# Patient Record
Sex: FEMALE | URBAN - METROPOLITAN AREA
[De-identification: names, ages, dates, MRNs, and addresses within clinical notes are randomized per-mention and may not be internally consistent; named-entity substitution may affect disease eponyms.]

---

## 2022-12-08 ENCOUNTER — HOSPITAL ENCOUNTER (INPATIENT)
Age: 52
LOS: 3 days | Discharge: HOME | DRG: 460 | End: 2022-12-11
Attending: INTERNAL MEDICINE | Admitting: INTERNAL MEDICINE
Payer: COMMERCIAL

## 2022-12-08 PROBLEM — N17.9 AKI (ACUTE KIDNEY INJURY) (HCC): Status: ACTIVE | Noted: 2022-12-08

## 2022-12-08 LAB
ANION GAP SERPL CALCULATED.3IONS-SCNC: 14 MMOL/L (ref 3–16)
ATYPICAL LYMPHOCYTE RELATIVE PERCENT: 3 % (ref 0–6)
BACTERIA: ABNORMAL /HPF
BASOPHILS ABSOLUTE: 0 K/UL (ref 0–0.2)
BASOPHILS RELATIVE PERCENT: 1 %
BILIRUBIN URINE: NEGATIVE
BLOOD, URINE: ABNORMAL
BUN BLDV-MCNC: 80 MG/DL (ref 7–20)
CALCIUM SERPL-MCNC: 7.3 MG/DL (ref 8.3–10.6)
CHLORIDE BLD-SCNC: 104 MMOL/L (ref 99–110)
CLARITY: CLEAR
CO2: 21 MMOL/L (ref 21–32)
COLOR: YELLOW
CREAT SERPL-MCNC: 5.8 MG/DL (ref 0.6–1.1)
EOSINOPHILS ABSOLUTE: 0 K/UL (ref 0–0.6)
EOSINOPHILS RELATIVE PERCENT: 1 %
EPITHELIAL CELLS, UA: 2 /HPF (ref 0–5)
GFR SERPL CREATININE-BSD FRML MDRD: 8 ML/MIN/{1.73_M2}
GLUCOSE BLD-MCNC: 206 MG/DL (ref 70–99)
GLUCOSE URINE: NEGATIVE MG/DL
HCT VFR BLD CALC: 22.8 % (ref 36–48)
HEMOGLOBIN: 7.7 G/DL (ref 12–16)
HYALINE CASTS: 14 /LPF (ref 0–8)
KETONES, URINE: NEGATIVE MG/DL
LEUKOCYTE ESTERASE, URINE: ABNORMAL
LYMPHOCYTES ABSOLUTE: 2.6 K/UL (ref 1–5.1)
LYMPHOCYTES RELATIVE PERCENT: 50 %
MCH RBC QN AUTO: 31.4 PG (ref 26–34)
MCHC RBC AUTO-ENTMCNC: 33.7 G/DL (ref 31–36)
MCV RBC AUTO: 93.1 FL (ref 80–100)
MICROSCOPIC EXAMINATION: YES
MONOCYTES ABSOLUTE: 0.1 K/UL (ref 0–1.3)
MONOCYTES RELATIVE PERCENT: 2 %
NEUTROPHILS ABSOLUTE: 2.1 K/UL (ref 1.7–7.7)
NEUTROPHILS RELATIVE PERCENT: 43 %
NITRITE, URINE: NEGATIVE
PDW BLD-RTO: 13.3 % (ref 12.4–15.4)
PH UA: 5.5 (ref 5–8)
PLATELET # BLD: 204 K/UL (ref 135–450)
PLATELET SLIDE REVIEW: ADEQUATE
PMV BLD AUTO: 8.6 FL (ref 5–10.5)
POTASSIUM SERPL-SCNC: 2.8 MMOL/L (ref 3.5–5.1)
PROTEIN UA: 30 MG/DL
RBC # BLD: 2.45 M/UL (ref 4–5.2)
RBC # BLD: NORMAL 10*6/UL
RBC UA: 1 /HPF (ref 0–4)
SLIDE REVIEW: ABNORMAL
SODIUM BLD-SCNC: 139 MMOL/L (ref 136–145)
SPECIFIC GRAVITY UA: 1.01 (ref 1–1.03)
TROPONIN: 0.01 NG/ML
URINE TYPE: ABNORMAL
UROBILINOGEN, URINE: 0.2 E.U./DL
WBC # BLD: 4.9 K/UL (ref 4–11)
WBC UA: 5 /HPF (ref 0–5)

## 2022-12-08 PROCEDURE — 6370000000 HC RX 637 (ALT 250 FOR IP): Performed by: INTERNAL MEDICINE

## 2022-12-08 PROCEDURE — 87086 URINE CULTURE/COLONY COUNT: CPT

## 2022-12-08 PROCEDURE — 84300 ASSAY OF URINE SODIUM: CPT

## 2022-12-08 PROCEDURE — 85025 COMPLETE CBC W/AUTO DIFF WBC: CPT

## 2022-12-08 PROCEDURE — 82570 ASSAY OF URINE CREATININE: CPT

## 2022-12-08 PROCEDURE — C9113 INJ PANTOPRAZOLE SODIUM, VIA: HCPCS | Performed by: INTERNAL MEDICINE

## 2022-12-08 PROCEDURE — 2580000003 HC RX 258: Performed by: INTERNAL MEDICINE

## 2022-12-08 PROCEDURE — 84484 ASSAY OF TROPONIN QUANT: CPT

## 2022-12-08 PROCEDURE — 83935 ASSAY OF URINE OSMOLALITY: CPT

## 2022-12-08 PROCEDURE — 81001 URINALYSIS AUTO W/SCOPE: CPT

## 2022-12-08 PROCEDURE — 2000000000 HC ICU R&B

## 2022-12-08 PROCEDURE — 80048 BASIC METABOLIC PNL TOTAL CA: CPT

## 2022-12-08 PROCEDURE — 2500000003 HC RX 250 WO HCPCS: Performed by: INTERNAL MEDICINE

## 2022-12-08 PROCEDURE — 6360000002 HC RX W HCPCS: Performed by: INTERNAL MEDICINE

## 2022-12-08 RX ORDER — SODIUM CHLORIDE 0.9 % (FLUSH) 0.9 %
5-40 SYRINGE (ML) INJECTION EVERY 12 HOURS SCHEDULED
Status: DISCONTINUED | OUTPATIENT
Start: 2022-12-08 | End: 2022-12-11 | Stop reason: HOSPADM

## 2022-12-08 RX ORDER — LOSARTAN POTASSIUM 100 MG/1
100 TABLET ORAL DAILY
Status: ON HOLD | COMMUNITY
End: 2022-12-11 | Stop reason: HOSPADM

## 2022-12-08 RX ORDER — ACETAMINOPHEN 325 MG/1
650 TABLET ORAL EVERY 6 HOURS PRN
Status: DISCONTINUED | OUTPATIENT
Start: 2022-12-08 | End: 2022-12-11 | Stop reason: HOSPADM

## 2022-12-08 RX ORDER — LAMOTRIGINE 100 MG/1
100 TABLET ORAL DAILY
COMMUNITY

## 2022-12-08 RX ORDER — PANTOPRAZOLE SODIUM 40 MG/10ML
40 INJECTION, POWDER, LYOPHILIZED, FOR SOLUTION INTRAVENOUS 2 TIMES DAILY
Status: DISCONTINUED | OUTPATIENT
Start: 2022-12-08 | End: 2022-12-11 | Stop reason: HOSPADM

## 2022-12-08 RX ORDER — NOREPINEPHRINE BIT/0.9 % NACL 16MG/250ML
1-100 INFUSION BOTTLE (ML) INTRAVENOUS CONTINUOUS
Status: DISCONTINUED | OUTPATIENT
Start: 2022-12-08 | End: 2022-12-11 | Stop reason: HOSPADM

## 2022-12-08 RX ORDER — HEPARIN SODIUM 5000 [USP'U]/ML
5000 INJECTION, SOLUTION INTRAVENOUS; SUBCUTANEOUS EVERY 8 HOURS SCHEDULED
Status: DISCONTINUED | OUTPATIENT
Start: 2022-12-08 | End: 2022-12-11 | Stop reason: HOSPADM

## 2022-12-08 RX ORDER — MIRTAZAPINE 15 MG/1
15 TABLET, FILM COATED ORAL NIGHTLY
COMMUNITY

## 2022-12-08 RX ORDER — ONDANSETRON HYDROCHLORIDE 8 MG/1
8 TABLET, FILM COATED ORAL EVERY 8 HOURS PRN
COMMUNITY

## 2022-12-08 RX ORDER — LISINOPRIL AND HYDROCHLOROTHIAZIDE 20; 12.5 MG/1; MG/1
1 TABLET ORAL DAILY
Status: ON HOLD | COMMUNITY
End: 2022-12-11 | Stop reason: HOSPADM

## 2022-12-08 RX ORDER — MAGNESIUM SULFATE IN WATER 40 MG/ML
2000 INJECTION, SOLUTION INTRAVENOUS PRN
Status: DISCONTINUED | OUTPATIENT
Start: 2022-12-08 | End: 2022-12-11 | Stop reason: HOSPADM

## 2022-12-08 RX ORDER — QUETIAPINE FUMARATE 100 MG/1
100 TABLET, FILM COATED ORAL NIGHTLY
COMMUNITY

## 2022-12-08 RX ORDER — ONDANSETRON 2 MG/ML
4 INJECTION INTRAMUSCULAR; INTRAVENOUS EVERY 6 HOURS PRN
Status: DISCONTINUED | OUTPATIENT
Start: 2022-12-08 | End: 2022-12-11 | Stop reason: HOSPADM

## 2022-12-08 RX ORDER — POTASSIUM CHLORIDE 20 MEQ/1
40 TABLET, EXTENDED RELEASE ORAL PRN
Status: DISCONTINUED | OUTPATIENT
Start: 2022-12-08 | End: 2022-12-11 | Stop reason: HOSPADM

## 2022-12-08 RX ORDER — SODIUM CHLORIDE 0.9 % (FLUSH) 0.9 %
5-40 SYRINGE (ML) INJECTION PRN
Status: DISCONTINUED | OUTPATIENT
Start: 2022-12-08 | End: 2022-12-11 | Stop reason: HOSPADM

## 2022-12-08 RX ORDER — ONDANSETRON 4 MG/1
4 TABLET, ORALLY DISINTEGRATING ORAL EVERY 8 HOURS PRN
Status: DISCONTINUED | OUTPATIENT
Start: 2022-12-08 | End: 2022-12-11 | Stop reason: HOSPADM

## 2022-12-08 RX ORDER — HYDROXYZINE HYDROCHLORIDE 25 MG/1
25 TABLET, FILM COATED ORAL EVERY 8 HOURS PRN
COMMUNITY

## 2022-12-08 RX ORDER — ACETAMINOPHEN 650 MG/1
650 SUPPOSITORY RECTAL EVERY 6 HOURS PRN
Status: DISCONTINUED | OUTPATIENT
Start: 2022-12-08 | End: 2022-12-11 | Stop reason: HOSPADM

## 2022-12-08 RX ORDER — POTASSIUM CHLORIDE 29.8 MG/ML
20 INJECTION INTRAVENOUS
Status: COMPLETED | OUTPATIENT
Start: 2022-12-08 | End: 2022-12-09

## 2022-12-08 RX ORDER — MIDODRINE HYDROCHLORIDE 5 MG/1
5 TABLET ORAL
Status: DISCONTINUED | OUTPATIENT
Start: 2022-12-08 | End: 2022-12-09

## 2022-12-08 RX ORDER — POTASSIUM CHLORIDE 7.45 MG/ML
10 INJECTION INTRAVENOUS PRN
Status: DISCONTINUED | OUTPATIENT
Start: 2022-12-08 | End: 2022-12-11 | Stop reason: HOSPADM

## 2022-12-08 RX ORDER — BUPRENORPHINE HYDROCHLORIDE AND NALOXONE HYDROCHLORIDE DIHYDRATE 8; 2 MG/1; MG/1
1 TABLET SUBLINGUAL DAILY
COMMUNITY

## 2022-12-08 RX ORDER — SODIUM CHLORIDE, SODIUM LACTATE, POTASSIUM CHLORIDE, CALCIUM CHLORIDE 600; 310; 30; 20 MG/100ML; MG/100ML; MG/100ML; MG/100ML
INJECTION, SOLUTION INTRAVENOUS CONTINUOUS
Status: DISCONTINUED | OUTPATIENT
Start: 2022-12-08 | End: 2022-12-09

## 2022-12-08 RX ORDER — SODIUM CHLORIDE 9 MG/ML
INJECTION, SOLUTION INTRAVENOUS PRN
Status: DISCONTINUED | OUTPATIENT
Start: 2022-12-08 | End: 2022-12-11 | Stop reason: HOSPADM

## 2022-12-08 RX ORDER — PANTOPRAZOLE SODIUM 40 MG/1
40 TABLET, DELAYED RELEASE ORAL DAILY
COMMUNITY

## 2022-12-08 RX ADMIN — Medication 10 ML: at 20:18

## 2022-12-08 RX ADMIN — HEPARIN SODIUM 5000 UNITS: 5000 INJECTION INTRAVENOUS; SUBCUTANEOUS at 21:45

## 2022-12-08 RX ADMIN — POTASSIUM CHLORIDE 20 MEQ: 400 INJECTION, SOLUTION INTRAVENOUS at 21:46

## 2022-12-08 RX ADMIN — POTASSIUM CHLORIDE 20 MEQ: 400 INJECTION, SOLUTION INTRAVENOUS at 23:12

## 2022-12-08 RX ADMIN — PANTOPRAZOLE SODIUM 40 MG: 40 INJECTION, POWDER, FOR SOLUTION INTRAVENOUS at 20:15

## 2022-12-08 RX ADMIN — Medication 3 MCG/MIN: at 20:57

## 2022-12-08 RX ADMIN — MIDODRINE HYDROCHLORIDE 5 MG: 5 TABLET ORAL at 18:37

## 2022-12-08 RX ADMIN — POTASSIUM CHLORIDE 20 MEQ: 400 INJECTION, SOLUTION INTRAVENOUS at 20:17

## 2022-12-08 RX ADMIN — SODIUM CHLORIDE: 9 INJECTION, SOLUTION INTRAVENOUS at 18:29

## 2022-12-08 RX ADMIN — ACETAMINOPHEN 650 MG: 325 TABLET ORAL at 21:48

## 2022-12-08 RX ADMIN — SODIUM CHLORIDE, POTASSIUM CHLORIDE, SODIUM LACTATE AND CALCIUM CHLORIDE: 600; 310; 30; 20 INJECTION, SOLUTION INTRAVENOUS at 18:26

## 2022-12-08 ASSESSMENT — PAIN DESCRIPTION - ORIENTATION
ORIENTATION: RIGHT
ORIENTATION: RIGHT

## 2022-12-08 ASSESSMENT — PAIN DESCRIPTION - PAIN TYPE: TYPE: CHRONIC PAIN

## 2022-12-08 ASSESSMENT — PAIN DESCRIPTION - DESCRIPTORS
DESCRIPTORS: ACHING
DESCRIPTORS: ACHING

## 2022-12-08 ASSESSMENT — PAIN SCALES - GENERAL
PAINLEVEL_OUTOF10: 6
PAINLEVEL_OUTOF10: 6

## 2022-12-08 ASSESSMENT — PAIN - FUNCTIONAL ASSESSMENT: PAIN_FUNCTIONAL_ASSESSMENT: PREVENTS OR INTERFERES SOME ACTIVE ACTIVITIES AND ADLS

## 2022-12-08 ASSESSMENT — PAIN DESCRIPTION - LOCATION
LOCATION: BACK;NECK
LOCATION: NECK;BACK

## 2022-12-08 ASSESSMENT — PAIN DESCRIPTION - FREQUENCY: FREQUENCY: CONTINUOUS

## 2022-12-08 NOTE — PROGRESS NOTES
Pt admitted to ICU bed #16. Pt alert and oriented X4. Able to follow commands. Vital signs stable. Complains of pain on her right side of her body 6/10. Dr. Phillip cortez served about pt admission. Waiting for orders.

## 2022-12-08 NOTE — H&P
Hospital Medicine History & Physical      PCP: No primary care provider on file. Date of Admission: 12/8/2022    Date of Service: Pt seen/examined on 12/8/2022 and Admitted to Inpatient with expected LOS greater than two midnights due to medical therapy. Chief Complaint:  AYSHA      History Of Present Illness:     47 y.o. female with PMH of Ulcerative colitis s/p colectomy and ileostomy, GERD, htn who presents with dizziness and generalized weakness. Was transferred from outside ED near [place] where she was found to be hypotensive and in severe AYSHA with Cr of 10.7. Had similar episode in the past.  Did have some nausea which has since improved. Dizziness better. No significant change in output from ileostomy. Denies acute complaints at this time  Continues to be hypotensive in 38C systolic      Past Medical History:      No past medical history on file. Past Surgical History:      No past surgical history on file. Medications Prior to Admission:      Prior to Admission medications    Not on File       Allergies:  Patient has no allergy information on record. Social History:      TOBACCO:   has no history on file for tobacco use. ETOH:   has no history on file for alcohol use. Family History:      No family history on file. REVIEW OF SYSTEMS:   Pertinent positives as noted in the HPI. All other systems reviewed and negative. PHYSICAL EXAM:    There were no vitals taken for this visit. Gen/overall appearance: Not in acute distress. Alert. Head: Normocephalic, atraumatic  Eyes: EOMI, no scleral icterus  CVS: regular rate and rhythm, Normal S1S2  Pulm: Clear to auscultation bilaterally. No crackles/wheezes  Gastrointestinal: Soft, nontender, nondistended, no guarding or rebound, R ileostomy  Extremities:No erythema or warmth  Neuro: No gross focal deficits noted  Skin: Warm, dry    Labs:     No results for input(s): WBC, HGB, HCT, PLT in the last 72 hours.   No results for input(s): NA, K, CL, CO2, BUN, CREATININE, CALCIUM, PHOS in the last 72 hours. Invalid input(s): MAGNES  No results for input(s): AST, ALT, BILIDIR, BILITOT, ALKPHOS in the last 72 hours. No results for input(s): INR in the last 72 hours. No results for input(s): Sudha Ying in the last 72 hours. Urinalysis:    No results found for: NITRU, [address], BACTERIA, RBCUA, BLOODU, SPECGRAV, GLUCOSEU      ASSESSMENT:    Shock. Suspect hypovolemic. Systolic 69O in ED; improving at this time  - ICU monitoring  - IVF hydration  - trial of po midodrine  - pressor support if persistent    Severe AYSHA. Likely prerenal azotemia. - Cr 10.7 in ED  - IVF hydration  - trend Cr  - monitor and replace lytes  - ins/outs  - avoid nephrotoxins  - nephro consult    PMH of Ulcerative colitis s/p colectomy and ileostomy, GERD, htn    DVT Prophylaxis: heparin  Diet: No diet orders on file  Code Status: No Order       Linda Zamora MD    Thank you No primary care provider on file. for the opportunity to be involved in this patient's care.

## 2022-12-09 ENCOUNTER — APPOINTMENT (OUTPATIENT)
Dept: ULTRASOUND IMAGING | Age: 52
DRG: 460 | End: 2022-12-09
Attending: INTERNAL MEDICINE
Payer: COMMERCIAL

## 2022-12-09 LAB
ALBUMIN SERPL-MCNC: 3.4 G/DL (ref 3.4–5)
ANION GAP SERPL CALCULATED.3IONS-SCNC: 12 MMOL/L (ref 3–16)
BASOPHILS ABSOLUTE: 0.1 K/UL (ref 0–0.2)
BASOPHILS RELATIVE PERCENT: 0.9 %
BUN BLDV-MCNC: 65 MG/DL (ref 7–20)
CALCIUM SERPL-MCNC: 8.5 MG/DL (ref 8.3–10.6)
CHLORIDE BLD-SCNC: 111 MMOL/L (ref 99–110)
CO2: 23 MMOL/L (ref 21–32)
CREAT SERPL-MCNC: 3.1 MG/DL (ref 0.6–1.1)
CREATININE URINE: 83.3 MG/DL (ref 28–259)
EOSINOPHILS ABSOLUTE: 0.1 K/UL (ref 0–0.6)
EOSINOPHILS RELATIVE PERCENT: 1.8 %
GFR SERPL CREATININE-BSD FRML MDRD: 18 ML/MIN/{1.73_M2}
GLUCOSE BLD-MCNC: 122 MG/DL (ref 70–99)
HCT VFR BLD CALC: 25.6 % (ref 36–48)
HEMOGLOBIN: 8.9 G/DL (ref 12–16)
INR BLD: 1.09 (ref 0.87–1.14)
LYMPHOCYTES ABSOLUTE: 3.7 K/UL (ref 1–5.1)
LYMPHOCYTES RELATIVE PERCENT: 47.7 %
MCH RBC QN AUTO: 32.2 PG (ref 26–34)
MCHC RBC AUTO-ENTMCNC: 34.7 G/DL (ref 31–36)
MCV RBC AUTO: 92.8 FL (ref 80–100)
MONOCYTES ABSOLUTE: 0.5 K/UL (ref 0–1.3)
MONOCYTES RELATIVE PERCENT: 7 %
NEUTROPHILS ABSOLUTE: 3.3 K/UL (ref 1.7–7.7)
NEUTROPHILS RELATIVE PERCENT: 42.6 %
PDW BLD-RTO: 13.2 % (ref 12.4–15.4)
PHOSPHORUS: 4.4 MG/DL (ref 2.5–4.9)
PLATELET # BLD: 250 K/UL (ref 135–450)
PMV BLD AUTO: 8.8 FL (ref 5–10.5)
POTASSIUM SERPL-SCNC: 3.4 MMOL/L (ref 3.5–5.1)
PROTHROMBIN TIME: 14 SEC (ref 11.7–14.5)
RBC # BLD: 2.76 M/UL (ref 4–5.2)
SODIUM BLD-SCNC: 146 MMOL/L (ref 136–145)
SODIUM URINE: 52 MMOL/L
URINE CULTURE, ROUTINE: NORMAL
WBC # BLD: 7.7 K/UL (ref 4–11)

## 2022-12-09 PROCEDURE — 99222 1ST HOSP IP/OBS MODERATE 55: CPT | Performed by: HOSPITALIST

## 2022-12-09 PROCEDURE — 6360000002 HC RX W HCPCS: Performed by: INTERNAL MEDICINE

## 2022-12-09 PROCEDURE — 36592 COLLECT BLOOD FROM PICC: CPT

## 2022-12-09 PROCEDURE — 2580000003 HC RX 258: Performed by: HOSPITALIST

## 2022-12-09 PROCEDURE — 76770 US EXAM ABDO BACK WALL COMP: CPT

## 2022-12-09 PROCEDURE — 2000000000 HC ICU R&B

## 2022-12-09 PROCEDURE — C9113 INJ PANTOPRAZOLE SODIUM, VIA: HCPCS | Performed by: INTERNAL MEDICINE

## 2022-12-09 PROCEDURE — 6370000000 HC RX 637 (ALT 250 FOR IP): Performed by: INTERNAL MEDICINE

## 2022-12-09 PROCEDURE — 80069 RENAL FUNCTION PANEL: CPT

## 2022-12-09 PROCEDURE — 85610 PROTHROMBIN TIME: CPT

## 2022-12-09 PROCEDURE — 85025 COMPLETE CBC W/AUTO DIFF WBC: CPT

## 2022-12-09 PROCEDURE — 2580000003 HC RX 258: Performed by: INTERNAL MEDICINE

## 2022-12-09 RX ORDER — SODIUM CHLORIDE 450 MG/100ML
INJECTION, SOLUTION INTRAVENOUS CONTINUOUS
Status: DISCONTINUED | OUTPATIENT
Start: 2022-12-09 | End: 2022-12-11

## 2022-12-09 RX ORDER — LAMOTRIGINE 100 MG/1
100 TABLET ORAL DAILY
Status: DISCONTINUED | OUTPATIENT
Start: 2022-12-09 | End: 2022-12-11 | Stop reason: HOSPADM

## 2022-12-09 RX ORDER — MIRTAZAPINE 15 MG/1
15 TABLET, FILM COATED ORAL NIGHTLY
Status: DISCONTINUED | OUTPATIENT
Start: 2022-12-09 | End: 2022-12-11 | Stop reason: HOSPADM

## 2022-12-09 RX ORDER — QUETIAPINE FUMARATE 100 MG/1
100 TABLET, FILM COATED ORAL NIGHTLY
Status: DISCONTINUED | OUTPATIENT
Start: 2022-12-09 | End: 2022-12-11 | Stop reason: HOSPADM

## 2022-12-09 RX ORDER — HYDROXYZINE HYDROCHLORIDE 25 MG/1
25 TABLET, FILM COATED ORAL EVERY 8 HOURS PRN
Status: DISCONTINUED | OUTPATIENT
Start: 2022-12-09 | End: 2022-12-11 | Stop reason: HOSPADM

## 2022-12-09 RX ORDER — MIDODRINE HYDROCHLORIDE 5 MG/1
10 TABLET ORAL
Status: DISCONTINUED | OUTPATIENT
Start: 2022-12-09 | End: 2022-12-10

## 2022-12-09 RX ORDER — BUPRENORPHINE HYDROCHLORIDE AND NALOXONE HYDROCHLORIDE DIHYDRATE 8; 2 MG/1; MG/1
2 TABLET SUBLINGUAL DAILY
Status: DISCONTINUED | OUTPATIENT
Start: 2022-12-09 | End: 2022-12-11 | Stop reason: HOSPADM

## 2022-12-09 RX ADMIN — SODIUM CHLORIDE, POTASSIUM CHLORIDE, SODIUM LACTATE AND CALCIUM CHLORIDE: 600; 310; 30; 20 INJECTION, SOLUTION INTRAVENOUS at 03:46

## 2022-12-09 RX ADMIN — PANTOPRAZOLE SODIUM 40 MG: 40 INJECTION, POWDER, FOR SOLUTION INTRAVENOUS at 20:58

## 2022-12-09 RX ADMIN — MIDODRINE HYDROCHLORIDE 10 MG: 5 TABLET ORAL at 17:26

## 2022-12-09 RX ADMIN — HEPARIN SODIUM 5000 UNITS: 5000 INJECTION INTRAVENOUS; SUBCUTANEOUS at 05:34

## 2022-12-09 RX ADMIN — LAMOTRIGINE 100 MG: 100 TABLET ORAL at 13:14

## 2022-12-09 RX ADMIN — BUPRENORPHINE HYDROCHLORIDE AND NALOXONE HYDROCHLORIDE DIHYDRATE 2 TABLET: 8; 2 TABLET SUBLINGUAL at 13:14

## 2022-12-09 RX ADMIN — SERTRALINE 50 MG: 50 TABLET, FILM COATED ORAL at 13:14

## 2022-12-09 RX ADMIN — Medication 10 ML: at 20:59

## 2022-12-09 RX ADMIN — HEPARIN SODIUM 5000 UNITS: 5000 INJECTION INTRAVENOUS; SUBCUTANEOUS at 13:14

## 2022-12-09 RX ADMIN — ACETAMINOPHEN 650 MG: 325 TABLET ORAL at 05:34

## 2022-12-09 RX ADMIN — Medication 10 ML: at 08:09

## 2022-12-09 RX ADMIN — PANTOPRAZOLE SODIUM 40 MG: 40 INJECTION, POWDER, FOR SOLUTION INTRAVENOUS at 08:08

## 2022-12-09 RX ADMIN — POTASSIUM CHLORIDE 40 MEQ: 1500 TABLET, EXTENDED RELEASE ORAL at 05:34

## 2022-12-09 RX ADMIN — QUETIAPINE FUMARATE 100 MG: 100 TABLET ORAL at 20:58

## 2022-12-09 RX ADMIN — MIDODRINE HYDROCHLORIDE 5 MG: 5 TABLET ORAL at 11:29

## 2022-12-09 RX ADMIN — MIDODRINE HYDROCHLORIDE 5 MG: 5 TABLET ORAL at 08:08

## 2022-12-09 RX ADMIN — HEPARIN SODIUM 5000 UNITS: 5000 INJECTION INTRAVENOUS; SUBCUTANEOUS at 20:58

## 2022-12-09 RX ADMIN — SODIUM CHLORIDE: 4.5 INJECTION, SOLUTION INTRAVENOUS at 11:24

## 2022-12-09 RX ADMIN — MIRTAZAPINE 15 MG: 15 TABLET, FILM COATED ORAL at 20:58

## 2022-12-09 ASSESSMENT — PAIN DESCRIPTION - DESCRIPTORS: DESCRIPTORS: ACHING

## 2022-12-09 ASSESSMENT — PAIN SCALES - GENERAL
PAINLEVEL_OUTOF10: 0
PAINLEVEL_OUTOF10: 5
PAINLEVEL_OUTOF10: 0

## 2022-12-09 ASSESSMENT — PAIN DESCRIPTION - ORIENTATION: ORIENTATION: RIGHT

## 2022-12-09 ASSESSMENT — PAIN DESCRIPTION - LOCATION: LOCATION: NECK;BACK

## 2022-12-09 NOTE — PROGRESS NOTES
4 Eyes Skin Assessment     NAME:  Chiquita Barrera  YOB: 1970  MEDICAL RECORD NUMBER:  [id number]    The patient is being assessed for  Admission    I agree that One RN have performed a thorough Head to Toe Skin Assessment on the patient. ALL assessment sites listed below have been assessed. Areas assessed by both nurses:    Head, Face, Ears, Shoulders, Back, Chest, Arms, Elbows, Hands, Sacrum. Buttock, Coccyx, Ischium, and Legs. Feet and Heels        Does the Patient have a Wound?  No noted wound(s)       Luis Manuel Prevention initiated by RN: NA   Wound Care Orders initiated by RN: No    Pressure Injury (Stage 3,4, Unstageable, DTI, NWPT, and Complex wounds) if present place referral order by RN under : No    New and Established Ostomies, if present place, referral order under : Yes      Nurse 1 eSignature: Electronically signed by Gita Rothman RN on 12/9/22 at 2:00 PM EST    **SHARE this note so that the co-signing nurse is able to place an eSignature**    Nurse 2 eSignature: {Esignature:[id number]}

## 2022-12-09 NOTE — PROGRESS NOTES
Shift assessment completed ( see Flow sheets for details). Pt alert and oriented X4. Able to follow commands. Denies any pain and discomfort at the moment. Pt states she is feeling better. Stays on pressure support, levo 2 mcg/min. Will titrate down to off as patient tolerates. Stays on continuous IV fluids. All safety measures stay active. Will continue to monitor.

## 2022-12-09 NOTE — PLAN OF CARE
Problem: Discharge Planning  Goal: Discharge to home or other facility with appropriate resources  Outcome: Progressing  Flowsheets  Taken 12/8/2022 2220  Discharge to home or other facility with appropriate resources:   Identify barriers to discharge with patient and caregiver   Arrange for needed discharge resources and transportation as appropriate   Refer to discharge planning if patient needs post-hospital services based on physician order or complex needs related to functional status, cognitive ability or social support system  Taken 12/8/2022 2000  Discharge to home or other facility with appropriate resources:   Identify barriers to discharge with patient and caregiver   Arrange for needed discharge resources and transportation as appropriate   Refer to discharge planning if patient needs post-hospital services based on physician order or complex needs related to functional status, cognitive ability or social support system     Problem: Safety - Adult  Goal: Free from fall injury  Outcome: Progressing     Problem: Pain  Goal: Verbalizes/displays adequate comfort level or baseline comfort level  Outcome: Progressing

## 2022-12-09 NOTE — PROGRESS NOTES
Hospitalist Progress Note      PCP: No primary care provider on file. Date of Admission: 12/8/2022    Chief Complaint: AYSHA      Subjective:   Denies new acute complaints. BP improving. Cr trending down       Medications:  Reviewed    Infusion Medications    sodium chloride 125 mL/hr at 12/09/22 1124    sodium chloride 5 mL/hr at 12/09/22 0546    norepinephrine 3 mcg/min (12/09/22 0546)     Scheduled Medications    buprenorphine-naloxone  2 tablet SubLINGual Daily    lamoTRIgine  100 mg Oral Daily    mirtazapine  15 mg Oral Nightly    QUEtiapine  100 mg Oral Nightly    sertraline  50 mg Oral Daily    midodrine  10 mg Oral TID WC    sodium chloride flush  5-40 mL IntraVENous 2 times per day    pantoprazole  40 mg IntraVENous BID    heparin (porcine)  5,000 Units SubCUTAneous 3 times per day     PRN Meds: hydrOXYzine HCl, sodium chloride flush, sodium chloride, ondansetron **OR** ondansetron, acetaminophen **OR** acetaminophen, magnesium sulfate, potassium chloride **OR** potassium alternative oral replacement **OR** potassium chloride      Intake/Output Summary (Last 24 hours) at 12/9/2022 1405  Last data filed at 12/9/2022 1200  Gross per 24 hour   Intake 2193.38 ml   Output 4155 ml   Net -1961.62 ml       Exam:    BP (!) 103/54   Pulse 73   Temp 97.5 °F (36.4 °C) (Temporal)   Resp 14   Ht 5' 5\" (1.651 m)   Wt 184 lb 8 oz (83.7 kg)   SpO2 99%   BMI 30.70 kg/m²     Gen/overall appearance: Not in acute distress. Alert. Head: Normocephalic, atraumatic  Eyes: EOMI, no scleral icterus  CVS: regular rate and rhythm, Normal S1S2  Pulm: Clear to auscultation bilaterally.  No crackles/wheezes  Gastrointestinal: Soft, nontender, nondistended, no guarding or rebound, R ileostomy  Extremities:No erythema or warmth  Neuro: No gross focal deficits noted  Skin: Warm, dry    Labs:   Recent Labs     12/08/22  1740 12/09/22  0335   WBC 4.9 7.7   HGB 7.7* 8.9*   HCT 22.8* 25.6*    250     Recent Labs 12/08/22  1740 12/09/22  0335    146*   K 2.8* 3.4*    111*   CO2 21 23   BUN 80* 65*   CREATININE 5.8* 3.1*   CALCIUM 7.3* 8.5   PHOS  --  4.4     No results for input(s): AST, ALT, BILIDIR, BILITOT, ALKPHOS in the last 72 hours. Recent Labs     12/09/22  0335   INR 1.09     Recent Labs     12/08/22 1740   TROPONINI 0.01       Assessment/Plan:    Active Hospital Problems    Diagnosis Date Noted    AYSHA (acute kidney injury) ([facility] [address] [N17.9] 12/08/2022     Priority: Medium       Shock. Suspect hypovolemic. Systolic 16L in ED; improving at this time  - ICU monitoring  - IVF hydration  - po midodrine up to 10  - pressor support as needed     Severe AYSHA. Likely prerenal azotemia. Improving  - Cr 10.7 in ED  - IVF hydration  - trend Cr  - monitor and replace lytes  - ins/outs  - avoid nephrotoxins  - nephro following; appreciate recs     PMH of Ulcerative colitis s/p colectomy and ileostomy, GERD, htn, illicit drug use on suboxone     DVT Prophylaxis: heparin  Diet: ADULT DIET;  Regular; Low Fat/Low Chol/High Fiber/FELICITY; Low Fiber  Code Status: Full Code      Linda Zamora MD

## 2022-12-09 NOTE — PROGRESS NOTES
Nutrition Note    RECOMMENDATIONS  PO Diet: Added low-fiber modifier to current diet  ONS: Denied need for any ONS    NUTRITION ASSESSMENT   Pt triggered for positive nutrition screen. Hx of UC s/p colectomy with ileostomy. Currently on cardiac restricted diet with one documented intake of 51-75% this morning. Upon visiting, pt reported 20 to 25 lb unintentional wt loss since September, some of which is associated with decreased po intake d/t having teeth pulled recently. Also reported acid reflux and \"stomach issues\" affect appetite/po intake. Said her UBW is 155 lb but bed wt today of 184 lb; no prior wt hx in EMR to accurately assess for recent wt changes. Reported she has only tolerated saltines and toast during current admission. Denied any ONS as they cause high ostomy output for her and denied need for easy to chew modifier. RD will continue to monitor for adequate po intake. Nutrition Related Findings: LR at 100 mL/hr, norepinephrine at 3 mcg/min. -1.4L. Na 146, K+ 3.4 and replaced, Cl 111. Ileostomy, BS hyperactive, diarrhea noted. No edema noted. Wounds: None  Nutrition Education:  Education not indicated   Nutrition Goals: PO intake 75% or greater, by next RD assessment     MALNUTRITION ASSESSMENT   Chronic Illness  Malnutrition Status: Insufficient data    NUTRITION DIAGNOSIS   Inadequate protein-energy intake related to altered GI structure as evidenced by poor intake prior to admission, weight loss    CURRENT NUTRITION THERAPIES  ADULT DIET; Regular; Low Fat/Low Chol/High Fiber/FELICITY     PO Intake: 51-75%   PO Supplement Intake:None Ordered    ANTHROPOMETRICS  Current Height: 5' 5\" (165.1 cm)  Current Weight: 184 lb 8 oz (83.7 kg)    Ideal Body Weight (IBW): 125 lbs  (57 kg)        BMI: 30.6    COMPARATIVE STANDARDS  Energy (kcal):  0165-9578     Protein (g):         Fluid (mL/day):  7854-2893    The patient will be monitored per nutrition standards of care.  Consult dietitian if additional nutrition interventions are needed prior to RD reassessment.      Scott De La Fuente MS, RD, LD    Contact: [phone]

## 2022-12-09 NOTE — PROGRESS NOTES
No significant events to note overnight. Pt A&O x4. Afebrile, SR per monitor, Sao2 maintained on RA with clear lung sounds bilaterally. BP supported with low dose levophed- see MAR for gtt rates. High output clear yellow urine per thompson catheter. Ileostomy intact and draining liquid brown green stool. K+ replaced. PRN tylenol x2 for c/o R neck and back pain. POC discussed and all questions addressed.

## 2022-12-09 NOTE — CONSULTS
Nephrology Consult Note                                                                                                                                                                                                                                                                                                                                                               Office : [phone]     Fax :[phone]              Patient's Name: Kate Blankenship  10:59 AM  12/9/2022    Reason for Consult: AYSHA  Requesting Physician:  No primary care provider on file. Chief Complaint:  weakness, dizziness     History of Present Ilness:    Kate Blankenship is a 47 y.o. female with PMH of UC s/p colectomy and ileostomy , HTN presented to OSH with c/o    Dizziness and generalized weakness  Found to have serum Cr ~ 10 and hypotension   Transferred to [facility] for further management    Serum Cr trending down  Alert awake   BP soft   On low dose of Levophed  Making urine          Past Medical History:   Diagnosis Date    CKD (chronic kidney disease)     Hypertension     Smoker     Ulcerative colitis ([address]        Past Surgical History:   Procedure Laterality Date    COLECTOMY      ILEOSTOMY      SPLENECTOMY         History reviewed. No pertinent family history. reports that she has been smoking cigarettes. She has been smoking an average of .5 packs per day. She has never used smokeless tobacco. She reports that she does not currently use drugs. She reports that she does not drink alcohol. Allergies:  Patient has no known allergies.     Current Medications:    sodium chloride flush 0.9 % injection 5-40 mL, 2 times per day  sodium chloride flush 0.9 % injection 5-40 mL, PRN  0.9 % sodium chloride infusion, PRN  ondansetron (ZOFRAN-ODT) disintegrating tablet 4 mg, Q8H PRN   Or  ondansetron (ZOFRAN) injection 4 mg, Q6H PRN  acetaminophen (TYLENOL) tablet 650 mg, Q6H PRN   Or  acetaminophen (TYLENOL) suppository 650 mg, Q6H PRN  lactated ringers infusion, Continuous  midodrine (PROAMATINE) tablet 5 mg, TID WC  pantoprazole (PROTONIX) injection 40 mg, BID  heparin (porcine) injection 5,000 Units, 3 times per day  magnesium sulfate 2000 mg in 50 mL IVPB premix, PRN  potassium chloride (KLOR-CON M) extended release tablet 40 mEq, PRN   Or  potassium bicarb-citric acid (EFFER-K) effervescent tablet 40 mEq, PRN   Or  potassium chloride 10 mEq/100 mL IVPB (Peripheral Line), PRN  norepinephrine (LEVOPHED) 16 mg in sodium chloride 0.9 % 250 mL infusion, Continuous        Review of Systems:   14 point ROS obtained but were negative except mentioned in HPI      Physical exam:     Vitals:  BP 92/65   Pulse 78   Temp 97.5 °F (36.4 °C) (Temporal)   Resp 18   Ht 5' 5\" (1.651 m)   Wt 184 lb 8 oz (83.7 kg)   SpO2 97%   BMI 30.70 kg/m²   Constitutional:  OAA X3 NAD  Skin: no rash, turgor wnl  Heent:  eomi, mmm  Neck: no bruits or jvd noted  Cardiovascular:  S1, S2 without m/r/g  Respiratory: CTA B without w/r/r  Abdomen:  +bs, soft, nt, nd    Osteomy +    Ext:  no lower extremity edema  Psychiatric: mood and affect appropriate  Musculoskeletal:  Rom, muscular strength intact    Data:   Labs:  CBC:   Recent Labs     12/08/22 1740 12/09/22  0335   WBC 4.9 7.7   HGB 7.7* 8.9*    250     BMP:    Recent Labs     12/08/22 1740 12/09/22  0335    146*   K 2.8* 3.4*    111*   CO2 21 23   BUN 80* 65*   CREATININE 5.8* 3.1*   GLUCOSE 206* 122*     Ca/Mg/Phos:   Recent Labs     12/08/22 1740 12/09/22  0335   CALCIUM 7.3* 8.5   PHOS  --  4.4     Hepatic: No results for input(s): AST, ALT, ALB, BILITOT, ALKPHOS in the last 72 hours. Troponin:   Recent Labs     12/08/22 1740   TROPONINI 0.01     BNP: No results for input(s): BNP in the last 72 hours. Lipids: No results for input(s): CHOL, TRIG, HDL, LDLCALC, LABVLDL in the last 72 hours.   ABGs: No results for input(s): PHART, PO2ART, CDU7HRF in the last 72 hours.  INR:   Recent Labs     12/09/22  0335   INR 1.09     UA:  Recent Labs     12/08/22  1830   COLORU Yellow   CLARITYU Clear   GLUCOSEU Negative   BILIRUBINUR Negative   KETUA Negative   SPECGRAV 1.010   BLOODU SMALL*   PHUR 5.5   PROTEINU 30*   UROBILINOGEN 0.2   NITRU Negative   LEUKOCYTESUR TRACE*   LABMICR YES   URINETYPE NotGiven      Urine Microscopic:   Recent Labs     12/08/22  1830   BACTERIA None Seen   HYALCAST 14*   WBCUA 5   RBCUA 1   EPIU 2     Urine Culture: No results for input(s): LABURIN in the last 72 hours.   Urine Chemistry:   Recent Labs     12/08/22  1830   LABCREA 83.3   NAUR 52             IMAGING:  US RETROPERITONEAL COMPLETE    (Results Pending)       Assessment/Plan       Acute renal failure     Etiology seems combination of pre renal and ATN    Pre renal from intravascular volume depletion    ATN from hypotension      AYSHA improving with IVF  Continue IVF   Change IVF to 1/2 NS due to serum Na 146  Keep MAP > 65 mmhg  Pressor support to keep MAP > 65 mmhg  Avoid contrast  Avoid NSAIDs  Avoid ACEI or ARB  I and O      Hypokalemia    Replace K with KCL prn    Hypernatremia    Change IVF to 1/2 NS    Shock     Continue IVF       H/o UC   S/p colectomy       Thank you for allowing us to participate in care of Renzo Osei MD  Feel free to contact me   [facility] [address]  Office : [phone]  Fax :[phone]

## 2022-12-10 LAB
ALBUMIN SERPL-MCNC: 3.4 G/DL (ref 3.4–5)
ANION GAP SERPL CALCULATED.3IONS-SCNC: 7 MMOL/L (ref 3–16)
BASOPHILS ABSOLUTE: 0 K/UL (ref 0–0.2)
BASOPHILS RELATIVE PERCENT: 0.8 %
BUN BLDV-MCNC: 32 MG/DL (ref 7–20)
CALCIUM SERPL-MCNC: 8.5 MG/DL (ref 8.3–10.6)
CHLORIDE BLD-SCNC: 111 MMOL/L (ref 99–110)
CO2: 26 MMOL/L (ref 21–32)
CREAT SERPL-MCNC: 1 MG/DL (ref 0.6–1.1)
EKG ATRIAL RATE: 67 BPM
EKG DIAGNOSIS: NORMAL
EKG P AXIS: 37 DEGREES
EKG P-R INTERVAL: 170 MS
EKG Q-T INTERVAL: 394 MS
EKG QRS DURATION: 88 MS
EKG QTC CALCULATION (BAZETT): 416 MS
EKG R AXIS: 14 DEGREES
EKG T AXIS: 23 DEGREES
EKG VENTRICULAR RATE: 67 BPM
EOSINOPHILS ABSOLUTE: 0.1 K/UL (ref 0–0.6)
EOSINOPHILS RELATIVE PERCENT: 1.4 %
GFR SERPL CREATININE-BSD FRML MDRD: >60 ML/MIN/{1.73_M2}
GLUCOSE BLD-MCNC: 165 MG/DL (ref 70–99)
HCT VFR BLD CALC: 30.7 % (ref 36–48)
HEMOGLOBIN: 10.2 G/DL (ref 12–16)
LYMPHOCYTES ABSOLUTE: 3.4 K/UL (ref 1–5.1)
LYMPHOCYTES RELATIVE PERCENT: 58.6 %
MCH RBC QN AUTO: 31.4 PG (ref 26–34)
MCHC RBC AUTO-ENTMCNC: 33.1 G/DL (ref 31–36)
MCV RBC AUTO: 94.8 FL (ref 80–100)
MONOCYTES ABSOLUTE: 0.4 K/UL (ref 0–1.3)
MONOCYTES RELATIVE PERCENT: 6.5 %
NEUTROPHILS ABSOLUTE: 1.9 K/UL (ref 1.7–7.7)
NEUTROPHILS RELATIVE PERCENT: 32.7 %
PDW BLD-RTO: 13.5 % (ref 12.4–15.4)
PHOSPHORUS: 2.5 MG/DL (ref 2.5–4.9)
PLATELET # BLD: 197 K/UL (ref 135–450)
PMV BLD AUTO: 9.4 FL (ref 5–10.5)
POTASSIUM SERPL-SCNC: 4 MMOL/L (ref 3.5–5.1)
RBC # BLD: 3.24 M/UL (ref 4–5.2)
SODIUM BLD-SCNC: 144 MMOL/L (ref 136–145)
WBC # BLD: 5.8 K/UL (ref 4–11)

## 2022-12-10 PROCEDURE — 80069 RENAL FUNCTION PANEL: CPT

## 2022-12-10 PROCEDURE — 99232 SBSQ HOSP IP/OBS MODERATE 35: CPT | Performed by: HOSPITALIST

## 2022-12-10 PROCEDURE — 6370000000 HC RX 637 (ALT 250 FOR IP): Performed by: INTERNAL MEDICINE

## 2022-12-10 PROCEDURE — 93005 ELECTROCARDIOGRAM TRACING: CPT | Performed by: INTERNAL MEDICINE

## 2022-12-10 PROCEDURE — 85025 COMPLETE CBC W/AUTO DIFF WBC: CPT

## 2022-12-10 PROCEDURE — C9113 INJ PANTOPRAZOLE SODIUM, VIA: HCPCS | Performed by: INTERNAL MEDICINE

## 2022-12-10 PROCEDURE — 2580000003 HC RX 258: Performed by: HOSPITALIST

## 2022-12-10 PROCEDURE — 2580000003 HC RX 258: Performed by: INTERNAL MEDICINE

## 2022-12-10 PROCEDURE — 2000000000 HC ICU R&B

## 2022-12-10 PROCEDURE — 6360000002 HC RX W HCPCS: Performed by: INTERNAL MEDICINE

## 2022-12-10 RX ORDER — MIDODRINE HYDROCHLORIDE 5 MG/1
10 TABLET ORAL
Status: DISCONTINUED | OUTPATIENT
Start: 2022-12-10 | End: 2022-12-11 | Stop reason: HOSPADM

## 2022-12-10 RX ADMIN — HEPARIN SODIUM 5000 UNITS: 5000 INJECTION INTRAVENOUS; SUBCUTANEOUS at 14:37

## 2022-12-10 RX ADMIN — LAMOTRIGINE 100 MG: 100 TABLET ORAL at 08:15

## 2022-12-10 RX ADMIN — PANTOPRAZOLE SODIUM 40 MG: 40 INJECTION, POWDER, FOR SOLUTION INTRAVENOUS at 08:15

## 2022-12-10 RX ADMIN — Medication 10 ML: at 20:45

## 2022-12-10 RX ADMIN — MIRTAZAPINE 15 MG: 15 TABLET, FILM COATED ORAL at 20:44

## 2022-12-10 RX ADMIN — SERTRALINE 50 MG: 50 TABLET, FILM COATED ORAL at 08:15

## 2022-12-10 RX ADMIN — BUPRENORPHINE HYDROCHLORIDE AND NALOXONE HYDROCHLORIDE DIHYDRATE 2 TABLET: 8; 2 TABLET SUBLINGUAL at 09:58

## 2022-12-10 RX ADMIN — PANTOPRAZOLE SODIUM 40 MG: 40 INJECTION, POWDER, FOR SOLUTION INTRAVENOUS at 20:44

## 2022-12-10 RX ADMIN — SODIUM CHLORIDE: 4.5 INJECTION, SOLUTION INTRAVENOUS at 04:14

## 2022-12-10 RX ADMIN — Medication 10 ML: at 08:24

## 2022-12-10 RX ADMIN — HEPARIN SODIUM 5000 UNITS: 5000 INJECTION INTRAVENOUS; SUBCUTANEOUS at 20:44

## 2022-12-10 RX ADMIN — MIDODRINE HYDROCHLORIDE 10 MG: 5 TABLET ORAL at 17:14

## 2022-12-10 RX ADMIN — HEPARIN SODIUM 5000 UNITS: 5000 INJECTION INTRAVENOUS; SUBCUTANEOUS at 05:15

## 2022-12-10 RX ADMIN — QUETIAPINE FUMARATE 100 MG: 100 TABLET ORAL at 20:44

## 2022-12-10 RX ADMIN — MIDODRINE HYDROCHLORIDE 10 MG: 5 TABLET ORAL at 12:37

## 2022-12-10 ASSESSMENT — PAIN SCALES - GENERAL
PAINLEVEL_OUTOF10: 0

## 2022-12-10 NOTE — PROGRESS NOTES
Pt had short episode of bradycardia HR 28 and dizziness, pt was sitting up in bed nurse was drawing labs, pt recovered quickly, EKG completed, pt resting in bed A&Ox4, no further complaints of dizziness vital signs currently stable HR 80.

## 2022-12-10 NOTE — PROGRESS NOTES
Hospitalist Progress Note      PCP: No primary care provider on file. Date of Admission: 12/8/2022    Chief Complaint: AYSHA      Subjective:   Denies new acute complaints. On low dose levophed support; titrating down  Cr now normalized      Medications:  Reviewed    Infusion Medications    sodium chloride 75 mL/hr at 12/10/22 0958    sodium chloride 5 mL/hr at 12/10/22 0417    norepinephrine 3 mcg/min (12/10/22 0959)     Scheduled Medications    midodrine  10 mg Oral TID WC    buprenorphine-naloxone  2 tablet SubLINGual Daily    lamoTRIgine  100 mg Oral Daily    mirtazapine  15 mg Oral Nightly    QUEtiapine  100 mg Oral Nightly    sertraline  50 mg Oral Daily    sodium chloride flush  5-40 mL IntraVENous 2 times per day    pantoprazole  40 mg IntraVENous BID    heparin (porcine)  5,000 Units SubCUTAneous 3 times per day     PRN Meds: hydrOXYzine HCl, sodium chloride flush, sodium chloride, ondansetron **OR** ondansetron, acetaminophen **OR** acetaminophen, magnesium sulfate, potassium chloride **OR** potassium alternative oral replacement **OR** potassium chloride      Intake/Output Summary (Last 24 hours) at 12/10/2022 1146  Last data filed at 12/10/2022 1037  Gross per 24 hour   Intake 3606.16 ml   Output 3325 ml   Net 281.16 ml         Exam:    BP (!) 120/56   Pulse 71   Temp 97.1 °F (36.2 °C) (Temporal)   Resp 13   Ht 5' 5\" (1.651 m)   Wt 194 lb 9.6 oz (88.3 kg)   SpO2 95%   BMI 32.38 kg/m²     Gen/overall appearance: Not in acute distress. Alert. Head: Normocephalic, atraumatic  Eyes: EOMI, no scleral icterus  CVS: regular rate and rhythm, Normal S1S2  Pulm: Clear to auscultation bilaterally.  No crackles/wheezes  Gastrointestinal: Soft, nontender, nondistended, no guarding or rebound, R ileostomy  Extremities:No erythema or warmth  Neuro: No gross focal deficits noted  Skin: Warm, dry    Labs:   Recent Labs     12/08/22  1740 12/09/22  0335 12/10/22  0531   WBC 4.9 7.7 5.8   HGB 7.7* 8.9* 10.2* HCT 22.8* 25.6* 30.7*    250 197       Recent Labs     12/08/22  1740 12/09/22  0335 12/10/22  0531    146* 144   K 2.8* 3.4* 4.0    111* 111*   CO2 21 23 26   BUN 80* 65* 32*   CREATININE 5.8* 3.1* 1.0   CALCIUM 7.3* 8.5 8.5   PHOS  --  4.4 2.5       No results for input(s): AST, ALT, BILIDIR, BILITOT, ALKPHOS in the last 72 hours. Recent Labs     12/09/22  0335   INR 1.09       Recent Labs     12/08/22 1740   TROPONINI 0.01         Assessment/Plan:    Active Hospital Problems    Diagnosis Date Noted    AYSHA (acute kidney injury) ([facility] [address] [N17.9] 12/08/2022     Priority: Medium       Shock. Suspect hypovolemic. Systolic 48N in ED; improving at this time  - ICU monitoring  - IVF hydration  - wean pressor support as tolerated  - po midodrine up to 10     Severe AYSHA. Likely prerenal azotemia. No resolved  - Cr 10.7 in ED  - IVF hydration  - trend Cr  - monitor and replace lytes  - ins/outs  - avoid nephrotoxins  - nephro following; appreciate recs     PMH of Ulcerative colitis s/p colectomy and ileostomy, GERD, htn, illicit drug use on suboxone     DVT Prophylaxis: heparin  Diet: ADULT DIET;  Regular; Low Fat/Low Chol/High Fiber/FELICITY; Low Fiber  Code Status: Full Code      Mely Magdaleno MD

## 2022-12-10 NOTE — PROGRESS NOTES
Nephrology  Progress Note                                                                                                                                                                                                                                                                                                                                                               Office : [phone]     Fax :[phone]              Patient's Name: Verna Mohan  9:46 AM  12/10/2022    Reason for Consult: AYSHA  Requesting Physician:  No primary care provider on file. Chief Complaint:  weakness, dizziness     History of Present Ilness:    Verna Mohan is a 47 y.o. female with PMH of UC s/p colectomy and ileostomy , HTN presented to OSH with c/o    Dizziness and generalized weakness  Found to have serum Cr ~ 10 and hypotension   Transferred to [facility] for further management    Serum Cr trending down  Alert awake   BP soft   On low dose of Levophed  Making urine      Interval History     AYSHA improved  AAO x 3  UOP good  On low dose of pressors         Past Medical History:   Diagnosis Date    CKD (chronic kidney disease)     Hypertension     Smoker     Ulcerative colitis ([facility] [address]        Past Surgical History:   Procedure Laterality Date    COLECTOMY      ILEOSTOMY      SPLENECTOMY         History reviewed. No pertinent family history. reports that she has been smoking cigarettes. She has been smoking an average of .5 packs per day. She has never used smokeless tobacco. She reports that she does not currently use drugs. She reports that she does not drink alcohol. Allergies:  Patient has no known allergies.     Current Medications:    0.45 % sodium chloride infusion, Continuous  buprenorphine-naloxone (SUBOXONE) 8-2 MG SL tablet 2 tablet - Patient Supplied (Patient Supplied), Daily  hydrOXYzine HCl (ATARAX) tablet 25 mg, Q8H PRN  lamoTRIgine (LAMICTAL) tablet 100 mg, Daily  mirtazapine (REMERON) tablet 15 mg, Nightly  QUEtiapine (SEROQUEL) tablet 100 mg, Nightly  sertraline (ZOLOFT) tablet 50 mg, Daily  sodium chloride flush 0.9 % injection 5-40 mL, 2 times per day  sodium chloride flush 0.9 % injection 5-40 mL, PRN  0.9 % sodium chloride infusion, PRN  ondansetron (ZOFRAN-ODT) disintegrating tablet 4 mg, Q8H PRN   Or  ondansetron (ZOFRAN) injection 4 mg, Q6H PRN  acetaminophen (TYLENOL) tablet 650 mg, Q6H PRN   Or  acetaminophen (TYLENOL) suppository 650 mg, Q6H PRN  pantoprazole (PROTONIX) injection 40 mg, BID  heparin (porcine) injection 5,000 Units, 3 times per day  magnesium sulfate 2000 mg in 50 mL IVPB premix, PRN  potassium chloride (KLOR-CON M) extended release tablet 40 mEq, PRN   Or  potassium bicarb-citric acid (EFFER-K) effervescent tablet 40 mEq, PRN   Or  potassium chloride 10 mEq/100 mL IVPB (Peripheral Line), PRN  norepinephrine (LEVOPHED) 16 mg in sodium chloride 0.9 % 250 mL infusion, Continuous      Review of Systems:   14 point ROS obtained but were negative except mentioned in HPI      Physical exam:     Vitals:  /76   Pulse 54   Temp 97.1 °F (36.2 °C) (Temporal)   Resp 16   Ht 5' 5\" (1.651 m)   Wt 194 lb 9.6 oz (88.3 kg)   SpO2 93%   BMI 32.38 kg/m²   Constitutional:  OAA X3 NAD  Skin: no rash, turgor wnl  Heent:  eomi, mmm  Neck: no bruits or jvd noted  Cardiovascular:  S1, S2 without m/r/g  Respiratory: CTA B without w/r/r  Abdomen:  +bs, soft, nt, nd    Osteomy +    Ext:  no lower extremity edema  Psychiatric: mood and affect appropriate  Musculoskeletal:  Rom, muscular strength intact    Data:   Labs:  CBC:   Recent Labs     12/08/22  1740 12/09/22  0335 12/10/22  0531   WBC 4.9 7.7 5.8   HGB 7.7* 8.9* 10.2*    250 197       BMP:    Recent Labs     12/08/22  1740 12/09/22  0335 12/10/22  0531    146* 144   K 2.8* 3.4* 4.0    111* 111*   CO2 21 23 26   BUN 80* 65* 32*   CREATININE 5.8* 3.1* 1.0   GLUCOSE 206* 122* 165*       Ca/Mg/Phos: Recent Labs     12/08/22  1740 12/09/22  0335 12/10/22  0531   CALCIUM 7.3* 8.5 8.5   PHOS  --  4.4 2.5       Hepatic: No results for input(s): AST, ALT, ALB, BILITOT, ALKPHOS in the last 72 hours. Troponin:   Recent Labs     12/08/22 1740   TROPONINI 0.01       BNP: No results for input(s): BNP in the last 72 hours. Lipids: No results for input(s): CHOL, TRIG, HDL, LDLCALC, LABVLDL in the last 72 hours. ABGs: No results for input(s): PHART, PO2ART, HRN3TAN in the last 72 hours. INR:   Recent Labs     12/09/22 0335   INR 1.09       UA:  Recent Labs     12/08/22 1830   COLORU Yellow   CLARITYU Clear   GLUCOSEU Negative   BILIRUBINUR Negative   KETUA Negative   SPECGRAV 1.010   BLOODU SMALL*   PHUR 5.5   PROTEINU 30*   UROBILINOGEN 0.2   NITRU Negative   LEUKOCYTESUR TRACE*   LABMICR YES   URINETYPE NotGiven        Urine Microscopic:   Recent Labs     12/08/22 1830   BACTERIA None Seen   HYALCAST 14*   WBCUA 5   RBCUA 1   EPIU 2       Urine Culture:   Recent Labs     12/08/22 1830   LABURIN No growth at 18 to 36 hours     Urine Chemistry:   Recent Labs     12/08/22 1830   LABCREA 83.3   NAUR 52               IMAGING:  US RENAL COMPLETE   Final Result   1. Urinary bladder is decompressed by Weems catheter. 2. Unremarkable ultrasound of the kidneys. 3. Hepatic steatosis noted.              Assessment/Plan       Acute renal failure     Etiology seems combination of pre renal and ATN    Pre renal from intravascular volume depletion    ATN from hypotension      AYSHA improved with IVF  Decrease IVF rate to 75 ml/hr    Keep MAP > 65 mmhg  Pressor support to keep MAP > 65 mmhg  Avoid contrast  Avoid NSAIDs  Avoid ACEI or ARB  I and O      Hypokalemia    Replace K with KCL prn    Hypernatremia    better    Shock     Continue IVF       H/o UC   S/p colectomy       Thank you for allowing us to participate in care of Jose Yoo MD  Feel free to contact me   [facility] [address]  Office : [phone]  Fax :[phone]

## 2022-12-11 VITALS
RESPIRATION RATE: 21 BRPM | BODY MASS INDEX: 32.57 KG/M2 | HEIGHT: 65 IN | DIASTOLIC BLOOD PRESSURE: 63 MMHG | TEMPERATURE: 97.6 F | SYSTOLIC BLOOD PRESSURE: 112 MMHG | HEART RATE: 50 BPM | WEIGHT: 195.5 LBS | OXYGEN SATURATION: 99 %

## 2022-12-11 LAB
ALBUMIN SERPL-MCNC: 3.5 G/DL (ref 3.4–5)
ANION GAP SERPL CALCULATED.3IONS-SCNC: 8 MMOL/L (ref 3–16)
BASOPHILS ABSOLUTE: 0 K/UL (ref 0–0.2)
BASOPHILS RELATIVE PERCENT: 0.8 %
BUN BLDV-MCNC: 18 MG/DL (ref 7–20)
CALCIUM SERPL-MCNC: 8.7 MG/DL (ref 8.3–10.6)
CHLORIDE BLD-SCNC: 111 MMOL/L (ref 99–110)
CO2: 24 MMOL/L (ref 21–32)
CREAT SERPL-MCNC: 0.6 MG/DL (ref 0.6–1.1)
EOSINOPHILS ABSOLUTE: 0.1 K/UL (ref 0–0.6)
EOSINOPHILS RELATIVE PERCENT: 2.1 %
GFR SERPL CREATININE-BSD FRML MDRD: >60 ML/MIN/{1.73_M2}
GLUCOSE BLD-MCNC: 88 MG/DL (ref 70–99)
HCT VFR BLD CALC: 24.8 % (ref 36–48)
HEMOGLOBIN: 8.4 G/DL (ref 12–16)
LYMPHOCYTES ABSOLUTE: 2.9 K/UL (ref 1–5.1)
LYMPHOCYTES RELATIVE PERCENT: 50.2 %
MCH RBC QN AUTO: 32.2 PG (ref 26–34)
MCHC RBC AUTO-ENTMCNC: 33.9 G/DL (ref 31–36)
MCV RBC AUTO: 95.2 FL (ref 80–100)
MONOCYTES ABSOLUTE: 0.4 K/UL (ref 0–1.3)
MONOCYTES RELATIVE PERCENT: 7 %
NEUTROPHILS ABSOLUTE: 2.3 K/UL (ref 1.7–7.7)
NEUTROPHILS RELATIVE PERCENT: 39.9 %
OSMOLALITY URINE: 260 MOSM/KG (ref 390–1070)
PDW BLD-RTO: 13.3 % (ref 12.4–15.4)
PHOSPHORUS: 2 MG/DL (ref 2.5–4.9)
PLATELET # BLD: 186 K/UL (ref 135–450)
PMV BLD AUTO: 9.6 FL (ref 5–10.5)
POTASSIUM SERPL-SCNC: 4 MMOL/L (ref 3.5–5.1)
RBC # BLD: 2.6 M/UL (ref 4–5.2)
SODIUM BLD-SCNC: 143 MMOL/L (ref 136–145)
WBC # BLD: 5.8 K/UL (ref 4–11)

## 2022-12-11 PROCEDURE — 6360000002 HC RX W HCPCS: Performed by: INTERNAL MEDICINE

## 2022-12-11 PROCEDURE — 99232 SBSQ HOSP IP/OBS MODERATE 35: CPT | Performed by: HOSPITALIST

## 2022-12-11 PROCEDURE — 80069 RENAL FUNCTION PANEL: CPT

## 2022-12-11 PROCEDURE — 85025 COMPLETE CBC W/AUTO DIFF WBC: CPT

## 2022-12-11 PROCEDURE — 2580000003 HC RX 258: Performed by: INTERNAL MEDICINE

## 2022-12-11 PROCEDURE — C9113 INJ PANTOPRAZOLE SODIUM, VIA: HCPCS | Performed by: INTERNAL MEDICINE

## 2022-12-11 PROCEDURE — 6370000000 HC RX 637 (ALT 250 FOR IP): Performed by: INTERNAL MEDICINE

## 2022-12-11 RX ORDER — MIDODRINE HYDROCHLORIDE 10 MG/1
10 TABLET ORAL
Qty: 90 TABLET | Refills: 3 | Status: SHIPPED | OUTPATIENT
Start: 2022-12-11

## 2022-12-11 RX ADMIN — HEPARIN SODIUM 5000 UNITS: 5000 INJECTION INTRAVENOUS; SUBCUTANEOUS at 06:21

## 2022-12-11 RX ADMIN — HEPARIN SODIUM 5000 UNITS: 5000 INJECTION INTRAVENOUS; SUBCUTANEOUS at 13:00

## 2022-12-11 RX ADMIN — PANTOPRAZOLE SODIUM 40 MG: 40 INJECTION, POWDER, FOR SOLUTION INTRAVENOUS at 08:39

## 2022-12-11 RX ADMIN — Medication 10 ML: at 08:49

## 2022-12-11 RX ADMIN — MIDODRINE HYDROCHLORIDE 10 MG: 5 TABLET ORAL at 08:39

## 2022-12-11 RX ADMIN — MIDODRINE HYDROCHLORIDE 10 MG: 5 TABLET ORAL at 13:00

## 2022-12-11 RX ADMIN — SERTRALINE 50 MG: 50 TABLET, FILM COATED ORAL at 08:39

## 2022-12-11 RX ADMIN — BUPRENORPHINE HYDROCHLORIDE AND NALOXONE HYDROCHLORIDE DIHYDRATE 2 TABLET: 8; 2 TABLET SUBLINGUAL at 08:47

## 2022-12-11 RX ADMIN — LAMOTRIGINE 100 MG: 100 TABLET ORAL at 08:39

## 2022-12-11 NOTE — DISCHARGE INSTR - COC
Continuity of Care Form    Patient Name: Toni Stephens   :  1970  MRN:  [id number]    [facility] date:  2022  Discharge date:  2022    Code Status Order: Full Code   Advance Directives:     Admitting Physician:  Wayne Hernandez MD  PCP: No primary care provider on file.     Discharging Nurse: Magda Morejon [facility] Unit/Room#: [id number]/[id number]  Discharging Unit Phone Number: [phone]    Emergency Contact:   Extended Emergency Contact Information  Primary Emergency Contact: [facility]  Mobile Phone: [phone]  Relation: Other Relative  Secondary Emergency Contact: Mahamed Bullock  Mobile Phone: [phone]  Relation: Other    Past Surgical History:  Past Surgical History:   Procedure Laterality Date    COLECTOMY      ILEOSTOMY      SPLENECTOMY         Immunization History:   Immunization History   Administered Date(s) Administered    COVID-19, PFIZER PURPLE top, DILUTE for use, (age 15 y+), 30mcg/0.3mL 10/21/2021, 2021       Active Problems:  Patient Active Problem List   Diagnosis Code    AYSHA (acute kidney injury) ([facility] [address] N17.9       Isolation/Infection:   Isolation            No Isolation          Patient Infection Status       None to display            Nurse Assessment:  Last Vital Signs: /63   Pulse 56   Temp 97.6 °F (36.4 °C) (Temporal)   Resp 16   Ht 5' 5\" (1.651 m)   Wt 195 lb 8 oz (88.7 kg)   SpO2 99%   BMI 32.53 kg/m²     Last documented pain score (0-10 scale): Pain Level: 0  Last Weight:   Wt Readings from Last 1 Encounters:   22 195 lb 8 oz (88.7 kg)     Mental Status:  {IP PT MENTAL STATUS:}    IV Access:  [facility] BOB IV ACCESS:512887475}    Nursing Mobility/ADLs:  Walking   {CHP DME QINF:005142092}  Transfer  {CHP DME ZKVC:169645093}  Bathing  {CHP DME WIYF:464573731}  Dressing  {CHP DME OVLM:901077195}  Toileting  {CHP DME ZSXX:048836202}  Feeding  {CHP DME WJWU:644532337}  Med Admin  {[facility] OGSR:661223354}  Med Delivery   {[facility] MED Delivery:614736799}    Wound Care Documentation and Therapy:        Elimination:  Continence: Bowel: {YES / WILLIAMS:38219}  Bladder: {YES / EI:62928}  Urinary Catheter: {Urinary Catheter:462342984}   Colostomy/Ileostomy/Ileal Conduit: {YES / BR:21023}  Ileostomy/Jejunostomy RUQ-Stomal Appliance: Clean, dry & intact  Ileostomy/Jejunostomy RUQ-Stoma  Assessment: Unable to assess  Ileostomy/Jejunostomy RUQ-Peristomal Assessment: Clean, dry & intact  Ileostomy/Jejunostomy RUQ-Stool Appearance: Watery  Ileostomy/Jejunostomy RUQ-Stool Color: Brown, Yellow  Ileostomy/Jejunostomy RUQ-Stool Amount: Medium  Ileostomy/Jejunostomy RUQ-Output (mL): 125 ml    Date of Last BM: ***    Intake/Output Summary (Last 24 hours) at 2022 1339  Last data filed at 2022 0600  Gross per 24 hour   Intake 1510.66 ml   Output 1075 ml   Net 435.66 ml     I/O last 3 completed shifts: In: 4576.9 [P.O.:620;  I.V.:3956.9]  Out: 4100 [Urine:2425; Stool:1675]    Safety Concerns:     [address] Safety Concerns:979323013}    Impairments/Disabilities:      [address] Impairments/Disabilities:389848628}    Nutrition Therapy:  Current Nutrition Therapy:   [address] Diet List:515779646}    Routes of Feeding: {CHP DME Other Feedings:444586107}  Liquids: {Slp liquid thickness:71660}  Daily Fluid Restriction: {CHP DME Yes amt example:285758323}  Last Modified Barium Swallow with Video (Video Swallowing Test): {Done Not Done ZFPD:676327845}    Treatments at the Time of Hospital Discharge:   Respiratory Treatments: ***  Oxygen Therapy:  {Therapy; copd oxygen:54269}  Ventilator:    { CC Vent TPZK:237940095}    Rehab Therapies: {THERAPEUTIC INTERVENTION:3817011440}  Weight Bearing Status/Restrictions: [facility] Weight Bearin}  Other Medical Equipment (for information only, NOT a DME order):  {EQUIPMENT:142170443}  Other Treatments: ***    Patient's personal belongings (please select all that are sent with patient):  {JARRED DME Belongings:780443248}    ISAI SIGNATURE:  {Esignature:824587209}    CASE MANAGEMENT/SOCIAL WORK SECTION    Inpatient Status Date: ***    Readmission Risk Assessment Score:  Readmission Risk              Risk of Unplanned Readmission:  13           Discharging to Facility/ Agency   Name:   Address:  Phone:  Fax:    Dialysis Facility (if applicable)   Name:  Address:  Dialysis Schedule:  Phone:  Fax:    / signature: {Esignature:444037247}    PHYSICIAN SECTION    Prognosis: {Prognosis:5690316759}    Condition at Discharge: [address] [place] [address] Patient Condition:860223107}    Rehab Potential (if transferring to Rehab): {Prognosis:9497419146}    Recommended Labs or Other Treatments After Discharge: ***    Physician Certification: I certify the above information and transfer of Dipak Mccollum  is necessary for the continuing treatment of the diagnosis listed and that she requires {Admit to Appropriate Level of Care:45966} for {GREATER/LESS:946266018} 30 days.      Update Admission H&P: {CHP DME Changes in KXKZX:738261542}    PHYSICIAN SIGNATURE:  {Esignature:720695162}

## 2022-12-11 NOTE — PROGRESS NOTES
Nephrology  Progress Note                                                                                                                                                                                                                                                                                                                                                               Office : [phone]     Fax :[phone]              Patient's Name: Kate Blankenship  8:43 AM  12/11/2022    Reason for Consult: AYSHA  Requesting Physician:  No primary care provider on file. Chief Complaint:  weakness, dizziness     History of Present Ilness:    Kate Blankenship is a 47 y.o. female with PMH of UC s/p colectomy and ileostomy , HTN presented to OSH with c/o    Dizziness and generalized weakness  Found to have serum Cr ~ 10 and hypotension   Transferred to [facility] for further management    Serum Cr trending down  Alert awake   BP soft   On low dose of Levophed  Making urine      Interval History     AYSHA improved  AAO x 3  UOP good  Off pressors        Past Medical History:   Diagnosis Date    CKD (chronic kidney disease)     Hypertension     Smoker     Ulcerative colitis ([facility] [address]        Past Surgical History:   Procedure Laterality Date    COLECTOMY      ILEOSTOMY      SPLENECTOMY         History reviewed. No pertinent family history. reports that she has been smoking cigarettes. She has been smoking an average of .5 packs per day. She has never used smokeless tobacco. She reports that she does not currently use drugs. She reports that she does not drink alcohol. Allergies:  Patient has no known allergies.     Current Medications:    midodrine (PROAMATINE) tablet 10 mg, TID WC  buprenorphine-naloxone (SUBOXONE) 8-2 MG SL tablet 2 tablet - Patient Supplied (Patient Supplied), Daily  hydrOXYzine HCl (ATARAX) tablet 25 mg, Q8H PRN  lamoTRIgine (LAMICTAL) tablet 100 mg, Daily  mirtazapine (REMERON) tablet 15 mg, Nightly  QUEtiapine (SEROQUEL) tablet 100 mg, Nightly  sertraline (ZOLOFT) tablet 50 mg, Daily  sodium chloride flush 0.9 % injection 5-40 mL, 2 times per day  sodium chloride flush 0.9 % injection 5-40 mL, PRN  0.9 % sodium chloride infusion, PRN  ondansetron (ZOFRAN-ODT) disintegrating tablet 4 mg, Q8H PRN   Or  ondansetron (ZOFRAN) injection 4 mg, Q6H PRN  acetaminophen (TYLENOL) tablet 650 mg, Q6H PRN   Or  acetaminophen (TYLENOL) suppository 650 mg, Q6H PRN  pantoprazole (PROTONIX) injection 40 mg, BID  heparin (porcine) injection 5,000 Units, 3 times per day  magnesium sulfate 2000 mg in 50 mL IVPB premix, PRN  potassium chloride (KLOR-CON M) extended release tablet 40 mEq, PRN   Or  potassium bicarb-citric acid (EFFER-K) effervescent tablet 40 mEq, PRN   Or  potassium chloride 10 mEq/100 mL IVPB (Peripheral Line), PRN  norepinephrine (LEVOPHED) 16 mg in sodium chloride 0.9 % 250 mL infusion, Continuous      Review of Systems:   14 point ROS obtained but were negative except mentioned in HPI      Physical exam:     Vitals:  /63   Pulse 62   Temp 97.6 °F (36.4 °C) (Temporal)   Resp 13   Ht 5' 5\" (1.651 m)   Wt 195 lb 8 oz (88.7 kg)   SpO2 99%   BMI 32.53 kg/m²   Constitutional:  OAA X3 NAD  Skin: no rash, turgor wnl  Heent:  eomi, mmm  Neck: no bruits or jvd noted  Cardiovascular:  S1, S2 without m/r/g  Respiratory: CTA B without w/r/r  Abdomen:  +bs, soft, nt, nd    Osteomy +    Ext:  no lower extremity edema  Psychiatric: mood and affect appropriate  Musculoskeletal:  Rom, muscular strength intact    Data:   Labs:  CBC:   Recent Labs     12/09/22  0335 12/10/22  0531 12/11/22  0622   WBC 7.7 5.8 5.8   HGB 8.9* 10.2* 8.4*    197 186       BMP:    Recent Labs     12/09/22  0335 12/10/22  0531 12/11/22  0622   * 144 143   K 3.4* 4.0 4.0   * 111* 111*   CO2 23 26 24   BUN 65* 32* 18   CREATININE 3.1* 1.0 0.6   GLUCOSE 122* 165* 88       Ca/Mg/Phos:   Recent Labs 12/09/22  0335 12/10/22  0531 12/11/22  0622   CALCIUM 8.5 8.5 8.7   PHOS 4.4 2.5 2.0*       Hepatic: No results for input(s): AST, ALT, ALB, BILITOT, ALKPHOS in the last 72 hours. Troponin:   Recent Labs     12/08/22  1740   TROPONINI 0.01       BNP: No results for input(s): BNP in the last 72 hours. Lipids: No results for input(s): CHOL, TRIG, HDL, LDLCALC, LABVLDL in the last 72 hours. ABGs: No results for input(s): PHART, PO2ART, DVZ0BFW in the last 72 hours. INR:   Recent Labs     12/09/22 0335   INR 1.09       UA:  Recent Labs     12/08/22 1830   COLORU Yellow   CLARITYU Clear   GLUCOSEU Negative   BILIRUBINUR Negative   KETUA Negative   SPECGRAV 1.010   BLOODU SMALL*   PHUR 5.5   PROTEINU 30*   UROBILINOGEN 0.2   NITRU Negative   LEUKOCYTESUR TRACE*   LABMICR YES   URINETYPE NotGiven        Urine Microscopic:   Recent Labs     12/08/22 1830   BACTERIA None Seen   HYALCAST 14*   WBCUA 5   RBCUA 1   EPIU 2       Urine Culture:   Recent Labs     12/08/22 1830   LABURIN No growth at 18 to 36 hours       Urine Chemistry:   Recent Labs     12/08/22 1830   LABCREA 83.3   NAUR 52               IMAGING:  US RENAL COMPLETE   Final Result   1. Urinary bladder is decompressed by Weems catheter. 2. Unremarkable ultrasound of the kidneys. 3. Hepatic steatosis noted.              Assessment/Plan       Acute renal failure     Etiology seems combination of pre renal and ATN    Pre renal from intravascular volume depletion    ATN from hypotension      AYSHA improved with IVF      DC IVF    Stable renal standpoint    Keep MAP > 65 mmhg  Pressor support to keep MAP > 65 mmhg  Avoid contrast  Avoid NSAIDs  Avoid ACEI or ARB  I and O      Hypokalemia    Replace K with KCL prn    Hypernatremia    better    Shock     improved      H/o UC   S/p colectomy       Thank you for allowing us to participate in care of Memo Barnard MD  Feel free to contact me   [facility] [address] [facility]  Office : [phone]  Fax :[phone] ([phone]) excellent

## 2022-12-11 NOTE — PLAN OF CARE
Problem: Discharge Planning  Goal: Discharge to home or other facility with appropriate resources  Outcome: Completed     Problem: Safety - Adult  Goal: Free from fall injury  Outcome: Completed     Problem: Pain  Goal: Verbalizes/displays adequate comfort level or baseline comfort level  Outcome: Completed     Problem: Nutrition Deficit:  Goal: Optimize nutritional status  Outcome: Completed     Problem: Skin/Tissue Integrity  Goal: Absence of new skin breakdown  Description: 1. Monitor for areas of redness and/or skin breakdown  2. Assess vascular access sites hourly  3. Every 4-6 hours minimum:  Change oxygen saturation probe site  4. Every 4-6 hours:  If on nasal continuous positive airway pressure, respiratory therapy assess nares and determine need for appliance change or resting period.   Outcome: Completed

## 2022-12-11 NOTE — DISCHARGE SUMMARY
Hospital Medicine Discharge Summary    Patient ID: Emaline Dense      Patient's PCP: No primary care provider on file. Admit Date: 12/8/2022     Discharge Date: 12/11/2022    Admitting Physician: Bonnie Denver, MD     Discharge Physician: Ladi Stewart MD     Discharge Diagnoses and Hospital Course: Active Hospital Problems    Diagnosis Date Noted    AYSHA (acute kidney injury) ([facility] [address] [N17.9] 12/08/2022     Priority: Medium       Shock. Suspect hypovolemic. Systolic 16T in ED. Now resolved  - ICU monitoring  - s/p IVF hydration  - weaned off pressor support  - BP meds held  - midodrine 10 tid  - close outpt follow up     Severe AYSHA. Likely prerenal azotemia with ATN. Now resolved  - Cr 10.7 in ED  - IVF hydration  - trend Cr  - monitor and replace lytes  - ins/outs  - avoid nephrotoxins  - nephro following; appreciate recs  - Cr back to baseline on d/c     PMH of Ulcerative colitis s/p colectomy and ileostomy, GERD, htn, illicit drug use on suboxone    **pt with request for dc      Exam:     The patient was seen and examined on day of discharge and this discharge summary is in conjunction with any daily progress note from day of discharge. Consults:     IP CONSULT TO NEPHROLOGY    Disposition:  home     Condition:  Stable    Discharge Instructions/Follow-up:   Pt to follow up with PCP within 1 week  Consultants as scheduled    Code Status:  Full Code    Activity: activity as tolerated    Diet: Resume home diet    Labs:  For convenience and continuity at follow-up the following most recent labs are provided:      CBC:    Lab Results   Component Value Date/Time    WBC 5.8 12/11/2022 06:22 AM    HGB 8.4 12/11/2022 06:22 AM    HCT 24.8 12/11/2022 06:22 AM     12/11/2022 06:22 AM       Renal:    Lab Results   Component Value Date/Time     12/11/2022 06:22 AM    K 4.0 12/11/2022 06:22 AM     12/11/2022 06:22 AM    CO2 24 12/11/2022 06:22 AM    BUN 18 12/11/2022 06:22 AM